# Patient Record
Sex: FEMALE
[De-identification: names, ages, dates, MRNs, and addresses within clinical notes are randomized per-mention and may not be internally consistent; named-entity substitution may affect disease eponyms.]

---

## 2018-12-15 ENCOUNTER — HOSPITAL ENCOUNTER (EMERGENCY)
Dept: HOSPITAL 31 - C.ER | Age: 53
Discharge: HOME | End: 2018-12-15
Payer: COMMERCIAL

## 2018-12-15 VITALS — TEMPERATURE: 97.6 F | OXYGEN SATURATION: 98 %

## 2018-12-15 VITALS — DIASTOLIC BLOOD PRESSURE: 93 MMHG | HEART RATE: 58 BPM | SYSTOLIC BLOOD PRESSURE: 153 MMHG | RESPIRATION RATE: 18 BRPM

## 2018-12-15 DIAGNOSIS — M79.18: Primary | ICD-10-CM

## 2018-12-15 NOTE — C.PDOC
History Of Present Illness


53 year old female presents to the ED complaining to the right anterio-lateral 

chest wall. Patient reports she right side 3 days ago. Denies any LOC, head 

trauma, nausea, vomiting, abdominal pain, palpitations, coughing, shortness of 

breath, or any other symptoms. 


Time Seen by Provider: 12/15/18 14:24


Chief Complaint (Nursing): Breast Problem


History Per: Patient


History/Exam Limitations: no limitations


Onset/Duration Of Symptoms: Days


Current Symptoms Are (Timing): Still Present





Past Medical History


Reviewed: Historical Data, Nursing Documentation, Vital Signs


Vital Signs: 





                                Last Vital Signs











Temp  97.6 F   12/15/18 13:53


 


Pulse  68   12/15/18 13:53


 


Resp  20   12/15/18 13:53


 


BP  145/63   12/15/18 13:53


 


Pulse Ox  98   12/15/18 13:53














- Medical History


PMH: No Chronic Diseases


Surgical History: No Surg Hx


Family History: States: No Known Family Hx





- Social History


Hx Alcohol Use: No


Hx Substance Use: No





Review Of Systems


Except As Marked, All Systems Reviewed And Found Negative.


Constitutional: Negative for: Fever, Chills


Cardiovascular: Positive for: Chest Pain (right anterio-lateral chest wall 

pain).  Negative for: Palpitations, Light Headedness


Respiratory: Negative for: Cough, Shortness of Breath


Gastrointestinal: Negative for: Nausea, Vomiting, Abdominal Pain, Diarrhea





Physical Exam





- Physical Exam


Appears: Non-toxic, No Acute Distress


Skin: Warm, Dry, No Rash


Head: Normacephalic


Eye(s): bilateral: Normal Inspection


Nose: Normal


Oral Mucosa: Moist


Neck: Supple


Chest: Symmetrical, Tenderness (right anterio-lateral chest wall )


Cardiovascular: Rhythm Regular


Respiratory: Normal Breath Sounds, No Rales, No Rhonchi, No Wheezing


Gastrointestinal/Abdominal: Soft, No Tenderness


Extremity: No Pedal Edema


Extremity: Bilateral: Atraumatic, Normal Color And Temperature, Normal ROM


Neurological/Psych: Oriented x3, Normal Speech


Gait: Steady





ED Course And Treatment


O2 Sat by Pulse Oximetry: 98 (RA)


Pulse Ox Interpretation: Normal





- Radiology


CXR: Interpreted by Me, Viewed By Me


CXR Interpretation: Yes: No Acute Disease


Progress Note: CXR ordered and reviewed. Patient treated with Ibuprofen and 

Lidoderm patch. Patient given Rx for Lidoderm patch, Motrin, and Tramadol. 

Patient given follow up instructions. Instructed to return to ER if symptoms 

worsen or new symptoms arise.





Disposition





- Disposition


Disposition: HOME/ ROUTINE


Disposition Time: 16:29


Condition: STABLE


Additional Instructions: 


Follow up with your PMD within 1-2 days. Return to Ed if feel worse.


Prescriptions: 


Lidocaine 5% [Lidoderm] 1 patch TP DAILY #30 patch


Ibuprofen [Motrin Tab] 600 mg PO Q8 #30 tab


traMADol [Ultram] 50 mg PO Q6 #20 tab


Instructions:  Muscle and Bone Pain (DC)


Forms:  [employer] (English)


Print Language: Cypriot





- Clinical Impression


Clinical Impression: 


 Musculoskeletal pain








- PA / NP / Resident Statement


MD/DO has reviewed & agrees with the documentation as recorded.





- Scribe Statement


The provider has reviewed the documentation as recorded by the Scribalfonso Garces








All medical record entries made by the Fernieibalfonso were at my direction and person

ally dictated by me. I have reviewed the chart and agree that the record 

accurately reflects my personal performance of the history, physical exam, 

medical decision making, and the department course for this patient. I have also

 personally directed, reviewed, and agree with the discharge instructions and 

disposition.

## 2018-12-30 ENCOUNTER — HOSPITAL ENCOUNTER (EMERGENCY)
Dept: HOSPITAL 31 - C.ER | Age: 53
Discharge: HOME | End: 2018-12-30
Payer: COMMERCIAL

## 2018-12-30 VITALS
HEART RATE: 78 BPM | TEMPERATURE: 97.8 F | SYSTOLIC BLOOD PRESSURE: 164 MMHG | DIASTOLIC BLOOD PRESSURE: 89 MMHG | OXYGEN SATURATION: 99 % | RESPIRATION RATE: 18 BRPM

## 2018-12-30 DIAGNOSIS — Z04.71: Primary | ICD-10-CM
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
